# Patient Record
Sex: FEMALE | Race: BLACK OR AFRICAN AMERICAN | NOT HISPANIC OR LATINO
[De-identification: names, ages, dates, MRNs, and addresses within clinical notes are randomized per-mention and may not be internally consistent; named-entity substitution may affect disease eponyms.]

---

## 2017-05-22 ENCOUNTER — APPOINTMENT (OUTPATIENT)
Dept: CARDIOLOGY | Facility: CLINIC | Age: 67
End: 2017-05-22

## 2017-05-22 VITALS
WEIGHT: 184 LBS | HEART RATE: 67 BPM | BODY MASS INDEX: 32.59 KG/M2 | OXYGEN SATURATION: 98 % | DIASTOLIC BLOOD PRESSURE: 75 MMHG | SYSTOLIC BLOOD PRESSURE: 117 MMHG

## 2017-11-27 ENCOUNTER — NON-APPOINTMENT (OUTPATIENT)
Age: 67
End: 2017-11-27

## 2017-11-27 ENCOUNTER — APPOINTMENT (OUTPATIENT)
Dept: CARDIOLOGY | Facility: CLINIC | Age: 67
End: 2017-11-27
Payer: COMMERCIAL

## 2017-11-27 VITALS
HEIGHT: 63 IN | HEART RATE: 69 BPM | WEIGHT: 182 LBS | SYSTOLIC BLOOD PRESSURE: 109 MMHG | BODY MASS INDEX: 32.25 KG/M2 | OXYGEN SATURATION: 96 % | DIASTOLIC BLOOD PRESSURE: 71 MMHG

## 2017-11-27 DIAGNOSIS — E11.9 TYPE 2 DIABETES MELLITUS W/OUT COMPLICATIONS: ICD-10-CM

## 2017-11-27 PROCEDURE — 99214 OFFICE O/P EST MOD 30 MIN: CPT

## 2017-11-27 PROCEDURE — 93000 ELECTROCARDIOGRAM COMPLETE: CPT

## 2018-06-04 ENCOUNTER — APPOINTMENT (OUTPATIENT)
Dept: CARDIOLOGY | Facility: CLINIC | Age: 68
End: 2018-06-04
Payer: COMMERCIAL

## 2018-06-04 ENCOUNTER — NON-APPOINTMENT (OUTPATIENT)
Age: 68
End: 2018-06-04

## 2018-06-04 VITALS
SYSTOLIC BLOOD PRESSURE: 121 MMHG | OXYGEN SATURATION: 97 % | DIASTOLIC BLOOD PRESSURE: 76 MMHG | HEART RATE: 67 BPM | BODY MASS INDEX: 32.95 KG/M2 | WEIGHT: 186 LBS

## 2018-06-04 PROCEDURE — 93000 ELECTROCARDIOGRAM COMPLETE: CPT

## 2018-06-04 PROCEDURE — 99214 OFFICE O/P EST MOD 30 MIN: CPT

## 2018-06-19 ENCOUNTER — APPOINTMENT (OUTPATIENT)
Dept: CARDIOLOGY | Facility: CLINIC | Age: 68
End: 2018-06-19
Payer: COMMERCIAL

## 2018-06-19 PROCEDURE — 93306 TTE W/DOPPLER COMPLETE: CPT

## 2018-12-10 ENCOUNTER — TRANSCRIPTION ENCOUNTER (OUTPATIENT)
Age: 68
End: 2018-12-10

## 2018-12-10 ENCOUNTER — APPOINTMENT (OUTPATIENT)
Dept: CARDIOLOGY | Facility: CLINIC | Age: 68
End: 2018-12-10
Payer: COMMERCIAL

## 2018-12-10 ENCOUNTER — NON-APPOINTMENT (OUTPATIENT)
Age: 68
End: 2018-12-10

## 2018-12-10 VITALS
HEART RATE: 63 BPM | WEIGHT: 179 LBS | BODY MASS INDEX: 31.71 KG/M2 | SYSTOLIC BLOOD PRESSURE: 137 MMHG | OXYGEN SATURATION: 93 % | HEIGHT: 63 IN | DIASTOLIC BLOOD PRESSURE: 82 MMHG

## 2018-12-10 PROCEDURE — 93000 ELECTROCARDIOGRAM COMPLETE: CPT

## 2018-12-10 PROCEDURE — 99214 OFFICE O/P EST MOD 30 MIN: CPT

## 2018-12-10 NOTE — HISTORY OF PRESENT ILLNESS
[FreeTextEntry1] : \par HTN\par h/o CM\par \par EF improved. Echo 6/2018: EF 50-55.\par Denied exertional symptoms. \par Compliant with mets and with life style modification. \par Denied dyspnea, CP, palpitaion, edema.\par BP stable and controlled. \par Denied exertional symptoms, reporting >4.0 METS exercise tolerance.\par Denied dizziness, orthopnea, PND, edema, CP, palpitation, nausea, vomiting, hematuria, melena, syncope, near syncope. \par \par FUNCTIONAL CAPACITY\par Walks 30 minutes daily\par Reports >4 METS\par \par CHRONIC, STABLE CONDITIONS\par HTN: Stable\par DM: On oral meds\par hypothyroidism s/p partial thyroidectomy for benign polyp\par h/o Cardiomyopathy. She had angiogram in 2012 showed luminal irregularities, EF 20%, moderate MR. \par Her LV systolic function improved. Echo 5/2016: Global LV systolic Fx is normal. EF 50 - 55%.\par Ascending Aorta dilated at 4.13cm . the aortic arch. d/w pt to follow with V[facility]. \par \par \par PRIOR CARDIAC TESTING\par  Echo on 5/16/2016:\par LV size is normal.\par Mild LVH\par Global LV systolic Fx is normal. EF 50 - 55%.\par Grade I DD.\par Normal RV size and FX.\par Mildly dilated RA.\par Moderate MR.\par Moderate TR. PASP 41.9, mild PHT.\par \par She reports excellent exercise tolerance >7.0 METS\par walks 1/2 hr daily. \par walks more then 4 block daily.\par Climbs up 1 flight in house for about 3 - 4 times daily.\par She denies exertional symptoms.\par \par \par \par \par  \par

## 2018-12-10 NOTE — PHYSICAL EXAM
[General Appearance - Well Developed] : well developed [Normal Appearance] : normal appearance [Well Groomed] : well groomed [General Appearance - Well Nourished] : well nourished [No Deformities] : no deformities [Normal Conjunctiva] : the conjunctiva exhibited no abnormalities [Eyelids - No Xanthelasma] : the eyelids demonstrated no xanthelasmas [Normal Oral Mucosa] : normal oral mucosa [No Oral Pallor] : no oral pallor [No Oral Cyanosis] : no oral cyanosis [Normal Oropharynx] : normal oropharynx [Normal Jugular Venous A Waves Present] : normal jugular venous A waves present [Normal Jugular Venous V Waves Present] : normal jugular venous V waves present [Respiration, Rhythm And Depth] : normal respiratory rhythm and effort [Exaggerated Use Of Accessory Muscles For Inspiration] : no accessory muscle use [Auscultation Breath Sounds / Voice Sounds] : lungs were clear to auscultation bilaterally [Lungs Percussion] : the lungs were normal to percussion [Heart Rate And Rhythm] : heart rate and rhythm were normal [Heart Sounds] : normal S1 and S2 [Arterial Pulses Normal] : the arterial pulses were normal [Edema] : no peripheral edema present [Veins - Varicosity Changes] : no varicosital changes were noted in the lower extremities [Bowel Sounds] : normal bowel sounds [Abdomen Soft] : soft [Abdomen Tenderness] : non-tender [Abnormal Walk] : normal gait [Cyanosis, Localized] : no localized cyanosis [Skin Color & Pigmentation] : normal skin color and pigmentation [] : no rash [No Venous Stasis] : no venous stasis [Skin Lesions] : no skin lesions [Oriented To Time, Place, And Person] : oriented to person, place, and time [Impaired Insight] : insight and judgment were intact [Affect] : the affect was normal [Mood] : the mood was normal [No Anxiety] : not feeling anxious [FreeTextEntry1] : 1/6 SM at apex

## 2018-12-10 NOTE — REVIEW OF SYSTEMS
[Eyeglasses] : currently wearing eyeglasses [Negative] : Endocrine [Dysuria] : no dysuria [Incontinence] : no incontinence [Joint Pain] : no joint pain [Joint Swelling] : no joint swelling [Joint Stiffness] : no joint stiffness [Muscle Cramps] : no muscle cramps [Limb Weakness (Paresis)] : no limb weakness [Easy Bleeding] : no tendency for easy bleeding [Swollen Glands] : no swollen glands [Easy Bruising] : no tendency for easy bruising

## 2019-07-01 ENCOUNTER — NON-APPOINTMENT (OUTPATIENT)
Age: 69
End: 2019-07-01

## 2019-07-01 ENCOUNTER — APPOINTMENT (OUTPATIENT)
Dept: CARDIOLOGY | Facility: CLINIC | Age: 69
End: 2019-07-01
Payer: COMMERCIAL

## 2019-07-01 VITALS
HEART RATE: 59 BPM | OXYGEN SATURATION: 97 % | DIASTOLIC BLOOD PRESSURE: 82 MMHG | BODY MASS INDEX: 30.47 KG/M2 | SYSTOLIC BLOOD PRESSURE: 142 MMHG | WEIGHT: 172 LBS

## 2019-07-01 PROCEDURE — 99214 OFFICE O/P EST MOD 30 MIN: CPT

## 2019-07-01 PROCEDURE — 93000 ELECTROCARDIOGRAM COMPLETE: CPT

## 2019-07-01 NOTE — PHYSICAL EXAM
[General Appearance - Well Developed] : well developed [Normal Appearance] : normal appearance [Well Groomed] : well groomed [General Appearance - Well Nourished] : well nourished [No Deformities] : no deformities [Normal Conjunctiva] : the conjunctiva exhibited no abnormalities [Normal Oral Mucosa] : normal oral mucosa [No Oral Pallor] : no oral pallor [No Oral Cyanosis] : no oral cyanosis [Normal Oropharynx] : normal oropharynx [Normal Jugular Venous A Waves Present] : normal jugular venous A waves present [Normal Jugular Venous V Waves Present] : normal jugular venous V waves present [Respiration, Rhythm And Depth] : normal respiratory rhythm and effort [Exaggerated Use Of Accessory Muscles For Inspiration] : no accessory muscle use [Auscultation Breath Sounds / Voice Sounds] : lungs were clear to auscultation bilaterally [Lungs Percussion] : the lungs were normal to percussion [Heart Rate And Rhythm] : heart rate and rhythm were normal [Heart Sounds] : normal S1 and S2 [Arterial Pulses Normal] : the arterial pulses were normal [Edema] : no peripheral edema present [Veins - Varicosity Changes] : no varicosital changes were noted in the lower extremities [Bowel Sounds] : normal bowel sounds [Abdomen Soft] : soft [Abdomen Tenderness] : non-tender [Abnormal Walk] : normal gait [Cyanosis, Localized] : no localized cyanosis [Skin Color & Pigmentation] : normal skin color and pigmentation [] : no rash [No Venous Stasis] : no venous stasis [Skin Lesions] : no skin lesions [Oriented To Time, Place, And Person] : oriented to person, place, and time [Impaired Insight] : insight and judgment were intact [Affect] : the affect was normal [Mood] : the mood was normal [No Anxiety] : not feeling anxious [FreeTextEntry1] : 1/6 SM at apex

## 2019-07-01 NOTE — HISTORY OF PRESENT ILLNESS
[FreeTextEntry1] : \par HTN\par h/o CM\par \par EF improved. Echo 6/2018: EF 50-55.\par Chronic. Unchnaged. Stable.\par Doing well. Denied dyspnea, orthopnea, PND, edema, CP>\par Complinat with diet, medication and exercise.\par Denied exertional symptoms. \par BP stable and controlled. \par Denied dizziness, orthopnea, PND, edema, CP, palpitation, irregular;ler beats,  nausea, vomiting, hematuria, melena, syncope, near syncope. \par \par FUNCTIONAL CAPACITY\par Walks 30 minutes daily\par Reports >4 METS\par \par CHRONIC, STABLE CONDITIONS\par HTN: Stable\par DM: On oral meds\par hypothyroidism s/p partial thyroidectomy for benign polyp\par h/o Cardiomyopathy. She had angiogram in 2012 showed luminal irregularities, EF 20%, moderate MR. \par Her LV systolic function improved. Echo 5/2016: Global LV systolic Fx is normal. EF 50 - 55%.\par Ascending Aorta dilated at 4.13cm . the aortic arch. d/w pt to follow with CT Surgery.  This is d/w the pt during this visit  (7/1/2019) once again. She hasn't gone to Vascular surgery yet. She stated she will do so. \par \par \par PRIOR CARDIAC TESTING\par  Echo on  6/2018\par LV size is normal.\par Mild LVH\par Global LV systolic Fx is normal. EF 50 - 55%.\par Grade I DD.\par Normal RV size and FX.\par Mildly dilated RA.\par Mild-Moderate MR.\par Moderate TR. PASP 48.4, mild PHT.The ascending aorta is dilated, measuring 4.13 cm. The aortic arch 26 cm and descending aorta 2.0cm are normal in caliber. \par \par She reports excellent exercise tolerance >7.0 METS\par walks 1/2 hr daily. \par walks more then 4 block daily.\par Climbs up 1 flight in house for about 3 - 4 times daily.\par She denies exertional symptoms.\par \par \par \par \par  \par

## 2022-01-31 ENCOUNTER — APPOINTMENT (OUTPATIENT)
Dept: CARDIOLOGY | Facility: CLINIC | Age: 72
End: 2022-01-31
Payer: COMMERCIAL

## 2022-01-31 ENCOUNTER — NON-APPOINTMENT (OUTPATIENT)
Age: 72
End: 2022-01-31

## 2022-01-31 VITALS
HEART RATE: 74 BPM | SYSTOLIC BLOOD PRESSURE: 120 MMHG | TEMPERATURE: 98 F | WEIGHT: 166 LBS | DIASTOLIC BLOOD PRESSURE: 58 MMHG | BODY MASS INDEX: 29.41 KG/M2 | HEIGHT: 63 IN | OXYGEN SATURATION: 97 %

## 2022-01-31 DIAGNOSIS — I27.20 PULMONARY HYPERTENSION, UNSPECIFIED: ICD-10-CM

## 2022-01-31 DIAGNOSIS — R01.1 CARDIAC MURMUR, UNSPECIFIED: ICD-10-CM

## 2022-01-31 PROCEDURE — 93000 ELECTROCARDIOGRAM COMPLETE: CPT

## 2022-01-31 PROCEDURE — 99214 OFFICE O/P EST MOD 30 MIN: CPT

## 2022-01-31 NOTE — PHYSICAL EXAM
[General Appearance - Well Developed] : well developed [Normal Appearance] : normal appearance [Well Groomed] : well groomed [General Appearance - Well Nourished] : well nourished [No Deformities] : no deformities [Normal Oral Mucosa] : normal oral mucosa [No Oral Pallor] : no oral pallor [No Oral Cyanosis] : no oral cyanosis [Normal Oropharynx] : normal oropharynx [Normal Jugular Venous A Waves Present] : normal jugular venous A waves present [Normal Jugular Venous V Waves Present] : normal jugular venous V waves present [Respiration, Rhythm And Depth] : normal respiratory rhythm and effort [Exaggerated Use Of Accessory Muscles For Inspiration] : no accessory muscle use [Auscultation Breath Sounds / Voice Sounds] : lungs were clear to auscultation bilaterally [Lungs Percussion] : the lungs were normal to percussion [Heart Rate And Rhythm] : heart rate and rhythm were normal [Heart Sounds] : normal S1 and S2 [Arterial Pulses Normal] : the arterial pulses were normal [Edema] : no peripheral edema present [Veins - Varicosity Changes] : no varicosital changes were noted in the lower extremities [Bowel Sounds] : normal bowel sounds [Abdomen Soft] : soft [Abdomen Tenderness] : non-tender [Abnormal Walk] : normal gait [Cyanosis, Localized] : no localized cyanosis [Skin Color & Pigmentation] : normal skin color and pigmentation [] : no rash [No Venous Stasis] : no venous stasis [Skin Lesions] : no skin lesions [Oriented To Time, Place, And Person] : oriented to person, place, and time [Impaired Insight] : insight and judgment were intact [Affect] : the affect was normal [Mood] : the mood was normal [No Anxiety] : not feeling anxious [Well Developed] : well developed [Well Nourished] : well nourished [No Acute Distress] : no acute distress [Normal Conjunctiva] : normal conjunctiva [Normal Venous Pressure] : normal venous pressure [No Carotid Bruit] : no carotid bruit [Normal S1, S2] : normal S1, S2 [No Rub] : no rub [No Gallop] : no gallop [Clear Lung Fields] : clear lung fields [Good Air Entry] : good air entry [No Respiratory Distress] : no respiratory distress  [Soft] : abdomen soft [Non Tender] : non-tender [No Masses/organomegaly] : no masses/organomegaly [Normal Bowel Sounds] : normal bowel sounds [Normal Gait] : normal gait [No Edema] : no edema [No Cyanosis] : no cyanosis [No Clubbing] : no clubbing [No Varicosities] : no varicosities [No Rash] : no rash [No Skin Lesions] : no skin lesions [Moves all extremities] : moves all extremities [No Focal Deficits] : no focal deficits [Normal Speech] : normal speech [Alert and Oriented] : alert and oriented [Normal memory] : normal memory [de-identified] : 2/6  apex  [FreeTextEntry1] : 1/6 SM at apex

## 2022-01-31 NOTE — HISTORY OF PRESENT ILLNESS
[FreeTextEntry1] : \par HTN\par NICM\par \par EF improved. Echo 6/2018: EF 50-55.\par Chronic. Unchanged. Stable. Feels well. Dneied ydspnea, CP, palpitaion.\par Walks 30 minutes daily.\par Doing well. Denied dyspnea, orthopnea, PND, edema, CP>\par Denied exertional symptoms. \par BP stable and controlled. \par Denied dizziness, orthopnea, PND, edema, CP, palpitation, irregular, hematuria, melena, syncope, near syncope. \par \par FUNCTIONAL CAPACITY\par Walks 30 minutes daily\par Reports >4 METS\par \par CHRONIC, STABLE CONDITIONS\par HTN: Stable\par DM: On oral meds\par hypothyroidism s/p partial thyroidectomy for benign polyp\par h/o Cardiomyopathy. She had angiogram in 2012 showed luminal irregularities, EF 20%, moderate MR. \par Her LV systolic function improved. Echo 5/2016: Global LV systolic Fx is normal. EF 50 - 55%.\par Ascending Aorta dilated at 4.13cm . the aortic arch. d/w pt to follow with CT Surgery.  This is d/w the pt during this visit  (7/1/2019) once again. She hasn't gone to Vascular surgery yet. She stated she will do so. \par \par \par PRIOR CARDIAC TESTING\par  Echo on  6/2018\par LV size is normal.\par Mild LVH\par Global LV systolic Fx is normal. EF 50 - 55%.\par Grade I DD.\par Normal RV size and FX.\par Mildly dilated RA.\par Mild-Moderate MR.\par Moderate TR. PASP 48.4, mild PHT.The ascending aorta is dilated, measuring 4.13 cm. The aortic arch 26 cm and descending aorta 2.0cm are normal in caliber. \par \par She reports excellent exercise tolerance >7.0 METS\par walks 1/2 hr daily. \par walks more then 4 block daily.\par Climbs up 1 flight in house for about 3 - 4 times daily.\par She denies exertional symptoms.\par \par \par \par \par  \par

## 2022-01-31 NOTE — CARDIOLOGY SUMMARY
[___] : [unfilled] [de-identified] : 1/31/2022\par Sinus  Rhythm  - occasional PAC   \par # PACs = 1.\par Low voltage in precordial leads. \par  -Anterior infarct -age undetermined  -Old inferior infarct. \par ABNORMAL \par

## 2022-02-01 ENCOUNTER — APPOINTMENT (OUTPATIENT)
Dept: CARDIOLOGY | Facility: CLINIC | Age: 72
End: 2022-02-01
Payer: COMMERCIAL

## 2022-02-01 PROCEDURE — 93306 TTE W/DOPPLER COMPLETE: CPT

## 2022-08-01 ENCOUNTER — APPOINTMENT (OUTPATIENT)
Dept: CARDIOLOGY | Facility: CLINIC | Age: 72
End: 2022-08-01

## 2022-08-01 ENCOUNTER — NON-APPOINTMENT (OUTPATIENT)
Age: 72
End: 2022-08-01

## 2022-08-01 VITALS
WEIGHT: 167 LBS | HEART RATE: 74 BPM | HEIGHT: 63 IN | TEMPERATURE: 97.8 F | SYSTOLIC BLOOD PRESSURE: 116 MMHG | OXYGEN SATURATION: 94 % | DIASTOLIC BLOOD PRESSURE: 68 MMHG | BODY MASS INDEX: 29.59 KG/M2

## 2022-08-01 PROCEDURE — 93000 ELECTROCARDIOGRAM COMPLETE: CPT

## 2022-08-01 PROCEDURE — 99214 OFFICE O/P EST MOD 30 MIN: CPT | Mod: 25

## 2022-08-01 RX ORDER — LOSARTAN POTASSIUM 50 MG/1
50 TABLET, FILM COATED ORAL
Qty: 30 | Refills: 0 | Status: ACTIVE | COMMUNITY
Start: 2022-08-01 | End: 1900-01-01

## 2022-08-01 RX ORDER — ALBUTEROL SULFATE 90 UG/1
108 (90 BASE) INHALANT RESPIRATORY (INHALATION)
Refills: 0 | Status: DISCONTINUED | COMMUNITY
End: 2022-08-01

## 2022-08-01 RX ORDER — HYDROCHLOROTHIAZIDE 12.5 MG/1
12.5 TABLET ORAL DAILY
Refills: 0 | Status: DISCONTINUED | COMMUNITY
End: 2022-08-01

## 2022-08-01 NOTE — HISTORY OF PRESENT ILLNESS
[FreeTextEntry1] : \par HTN\par NICM\par \par TTE 5/2018: LVEF improved 50-55.\par TTE 2/2/2022: :LVEF 50-55. DD 1. Mild septal hypertrophy. Moderat RMR. Moderate TR. Dilated ascenbding Ao 4.2 cm. PASP 32 mmHg, \par Walks 30 minutes daily. Denied dyspnea, exertional symptoms, orthopnea, PND, edema, CP, syncope, enar syncope.\par BP stable and controlled. \par \par FUNCTIONAL CAPACITY\par Walks 30 minutes daily\par Reports >4 METS\par \par CHRONIC, STABLE CONDITIONS\par HTN: Stable\par DM: On oral meds\par hypothyroidism s/p partial thyroidectomy for benign polyp\par h/o Cardiomyopathy. She had angiogram in 2012 showed luminal irregularities, EF 20%, moderate MR. \par Her LV systolic function improved. Echo 5/2016: Global LV systolic Fx is normal. EF 50 - 55%.\par Ascending Aorta dilated at 4.13cm . the aortic arch. d/w pt to follow with CT Surgery.  This is d/w the pt during this visit  (7/1/2019) once again. She hasn't gone to CT surgery yet. \par \par \par PRIOR CARDIAC TESTING\par Echo on  6/2018\par LV size is normal.\par Mild LVH\par Global LV systolic Fx is normal. EF 50 - 55%.\par Grade I DD.\par Normal RV size and FX.\par Mildly dilated RA.\par Mild-Moderate MR.\par Moderate TR. PASP 48.4, mild PHT.The ascending aorta is dilated, measuring 4.13 cm. The aortic arch 26 cm and descending aorta 2.0cm are normal in caliber. \par \par She reports excellent exercise tolerance >7.0 METS\par walks 1/2 hr daily. \par walks more then 4 block daily.\par Climbs up 1 flight in house for about 3 - 4 times daily.\par She denies exertional symptoms.\par \par \par \par \par  \par

## 2022-08-01 NOTE — CARDIOLOGY SUMMARY
[___] : [unfilled] [de-identified] : 8/1/2022\par Sinus  Rhythm \par -Anterior infarct -age undetermined  -Old inferior infarct. \par ABNORMAL \par \par \par 1/31/2022\par Sinus  Rhythm  - occasional PAC   \par # PACs = 1.\par Low voltage in precordial leads. \par  -Anterior infarct -age undetermined  -Old inferior infarct. \par ABNORMAL \par

## 2022-08-01 NOTE — REASON FOR VISIT
[Symptom and Test Evaluation] : symptom and test evaluation [Structural Heart and Valve Disease] : structural heart and valve disease [Hypertension] : hypertension [Follow-Up - Clinic] : a clinic follow-up of [FreeTextEntry1] : HTN, history of CMP

## 2022-08-01 NOTE — PHYSICAL EXAM
[Well Developed] : well developed [Well Nourished] : well nourished [No Acute Distress] : no acute distress [Normal Venous Pressure] : normal venous pressure [No Carotid Bruit] : no carotid bruit [Normal S1, S2] : normal S1, S2 [No Rub] : no rub [No Gallop] : no gallop [Clear Lung Fields] : clear lung fields [Good Air Entry] : good air entry [No Respiratory Distress] : no respiratory distress  [Soft] : abdomen soft [Non Tender] : non-tender [No Masses/organomegaly] : no masses/organomegaly [Normal Bowel Sounds] : normal bowel sounds [Normal Gait] : normal gait [No Edema] : no edema [No Cyanosis] : no cyanosis [No Clubbing] : no clubbing [No Varicosities] : no varicosities [No Rash] : no rash [No Skin Lesions] : no skin lesions [Moves all extremities] : moves all extremities [No Focal Deficits] : no focal deficits [Normal Speech] : normal speech [Alert and Oriented] : alert and oriented [Normal memory] : normal memory [General Appearance - Well Developed] : well developed [Normal Appearance] : normal appearance [Well Groomed] : well groomed [General Appearance - Well Nourished] : well nourished [No Deformities] : no deformities [Normal Conjunctiva] : the conjunctiva exhibited no abnormalities [Normal Oral Mucosa] : normal oral mucosa [No Oral Cyanosis] : no oral cyanosis [No Oral Pallor] : no oral pallor [Normal Oropharynx] : normal oropharynx [Normal Jugular Venous A Waves Present] : normal jugular venous A waves present [Normal Jugular Venous V Waves Present] : normal jugular venous V waves present [Respiration, Rhythm And Depth] : normal respiratory rhythm and effort [Exaggerated Use Of Accessory Muscles For Inspiration] : no accessory muscle use [Auscultation Breath Sounds / Voice Sounds] : lungs were clear to auscultation bilaterally [Lungs Percussion] : the lungs were normal to percussion [Heart Rate And Rhythm] : heart rate and rhythm were normal [Heart Sounds] : normal S1 and S2 [Arterial Pulses Normal] : the arterial pulses were normal [Edema] : no peripheral edema present [Veins - Varicosity Changes] : no varicosital changes were noted in the lower extremities [Bowel Sounds] : normal bowel sounds [Abdomen Soft] : soft [Abdomen Tenderness] : non-tender [Abnormal Walk] : normal gait [Cyanosis, Localized] : no localized cyanosis [Skin Color & Pigmentation] : normal skin color and pigmentation [] : no rash [No Venous Stasis] : no venous stasis [Skin Lesions] : no skin lesions [Oriented To Time, Place, And Person] : oriented to person, place, and time [Impaired Insight] : insight and judgment were intact [Affect] : the affect was normal [Mood] : the mood was normal [No Anxiety] : not feeling anxious [de-identified] : 2/6  apex  [FreeTextEntry1] : 1/6 SM at apex

## 2022-08-31 ENCOUNTER — NON-APPOINTMENT (OUTPATIENT)
Age: 72
End: 2022-08-31

## 2023-02-06 ENCOUNTER — APPOINTMENT (OUTPATIENT)
Dept: CARDIOLOGY | Facility: CLINIC | Age: 73
End: 2023-02-06
Payer: COMMERCIAL

## 2023-02-06 ENCOUNTER — NON-APPOINTMENT (OUTPATIENT)
Age: 73
End: 2023-02-06

## 2023-02-06 VITALS
SYSTOLIC BLOOD PRESSURE: 112 MMHG | OXYGEN SATURATION: 95 % | HEART RATE: 77 BPM | HEIGHT: 63 IN | WEIGHT: 167.25 LBS | BODY MASS INDEX: 29.63 KG/M2 | TEMPERATURE: 98.3 F | DIASTOLIC BLOOD PRESSURE: 64 MMHG

## 2023-02-06 PROCEDURE — 99214 OFFICE O/P EST MOD 30 MIN: CPT | Mod: 25

## 2023-02-06 PROCEDURE — 93000 ELECTROCARDIOGRAM COMPLETE: CPT

## 2023-02-06 NOTE — END OF VISIT
Pulmonary Consultation Note      History     Chief Complaint   Patient presents with   • Medical Problem Re-evaluation   • Hives     Smoking: Former smoker  Leah Randolph is a 72 year old female from nursing home with PMHx of anemia, CKD, COPD, HTN, GERD, RAD, dual pacemaker, afib on eliquis, sleep apnea, subdural hemorrhage (may 2021) s/p craniotomy complicated by recurrent subdural bleed requiring sarah holes, urinary incontinence, was recently discharge on July 7 for fever and encephalopathy, concern for meningitis, treated with ceftriaxone and vancomycin since June 30, presented in the ED for rash. Per chart review, she developed rash after antibiotic administration, it was unclear what was given. She has a diffuse maculopapular rash, with raised borders, pruritic, she had some wheezing on arrival. She is confused so history is limited and acquired mainly from chart review.     In ER, awake, alert, oriented only to self. She is following commands. V/S 144/72 83, 99.9F rectal, 20 satting 96% on 3L. Lab shows normal abg. CBC wbc 13.0, hgb 9.9, normal platelet count. CMP has elevated creatinine, otherwise unremarkable. Lactic acid 0.8. trop negative. UA shows trace ketones, protein, leukocytes, few bacteria and hyaline cast. CXR no acute findings, no consolidation. CTH no interval change from previous, there is stable subdural fluid over left frontal convexity at the vertex. No new hemorrhage.  US BLE negative for DVT.       Past Medical History:   Diagnosis Date   • Anemia    • Cardiac pacemaker    • Chronic kidney disease    • COPD (chronic obstructive pulmonary disease) (CMS/HCC)    • Depression    • Essential (primary) hypertension    • Fracture    • Gastroesophageal reflux disease    • RAD (reactive airway disease)    • Sleep apnea    • Subdural hemorrhage (CMS/HCC)    • Urinary incontinence      Past Surgical History:   Procedure Laterality Date   • Craniotomy     • Hysterectomy     • Removal gallbladder      • Spine surgery      cervicle with titanium     Social History     Tobacco Use   • Smoking status: Former Smoker     Quit date: 12/2010     Years since quitting: 10.6   • Smokeless tobacco: Never Used   Vaping Use   • Vaping Use: Never assessed   Substance Use Topics   • Alcohol use: Not Currently     Comment: socially   • Drug use: Never     Family History   Problem Relation Age of Onset   • Cancer Mother    • Cancer Father          • lactated ringers infusion 125 mL/hr at 07/10/21 0300        ALLERGIES:   Allergen Reactions   • Codeine GI UPSET and Nausea & Vomiting        ROS: unable to acquire, confused. Although denies any complaints.     Physical Exam     Visit Vitals  /54 (BP Location: LUE - Left upper extremity)   Pulse 83   Temp 97.3 °F (36.3 °C) (Oral)   Resp (!) 25   LMP 11/29/1984 (Approximate)   SpO2 95%       Physical Exam:  General: obese female, in bed, resting comfortably in nad  Neuro: awake, alert, disoriented, follows commands, moves all four extremities.   Head: nc, at  EENT: no sinus ttp  Neck: supple, no jvd, no appreciable LAD  Resp: diminished, clear  CV: s1,s2; rrr; no audible murmur  Abd: s, nt, bs+, distended  Ext: no clubbing or edema; pulses present and equal bilaterally. PICC upper arm.   Skin: diffuse and generalized pruritic maculopapular rashes with raised borders more pronounced in the lower extremities.     Recent Labs   Lab 07/09/21 2238 07/05/21 0422 07/04/21  0655 07/04/21  0525   WBC 13.0* 10.0  --  8.3   RBC 2.71* 2.48*  --  2.41*   HGB 7.9* 7.2* 7.4* 6.9*   HCT 26.1* 24.3* 26.0* 23.9*   MCV 96.3 98.0  --  99.2   MCH 29.2 29.0  --  28.6   MCHC 30.3* 29.6*  --  28.9*    269  --  254     Recent Labs   Lab 07/09/21 2238 07/06/21  0946 07/05/21  0422   CO2 28 28 28   BUN 31* 19 19   AST 10  --   --      No results found   CT HEAD WO CONTRAST    Result Date: 7/10/2021  PROCEDURE INFORMATION: Exam: CT Head Without Contrast Exam date and time: 7/10/2021 1:05 AM  Age: 72 years old Clinical indication: Pain; Additional info: Mental status change, unknown cause TECHNIQUE: Imaging protocol: Computed tomography of the head without contrast. Radiation optimization: All CT scans at this facility use at least one of these dose optimization techniques: automated exposure control; mA and/or kV adjustment per patient size (includes targeted exams where dose is matched to clinical indication); or iterative reconstruction. COMPARISON: CT HEAD W WO CONTRAST EP 7/2/2021 8:02 AM FINDINGS: Brain: There is stable trace subdural fluid (maximum thickness of 4-5 mm)  over the left frontal convexity at the vertex. There are overlying craniotomy changes and sarah hole defects compared to prior study performed July 2, 2021.  There is no new hemorrhage. There is no midline shift or herniation.  Incidental note is made of hyperostosis frontalis. Cerebral ventricles: No ventriculomegaly. Paranasal sinuses: Visualized sinuses are unremarkable. No fluid levels. Mastoid air cells: Visualized mastoid air cells are well aerated. Bones/joints: See \"Brain\" finding. Soft tissues: Unremarkable.     No significant interval change. Electronically Signed by: JOS SHORT M.D. Signed on: 07/10/2021 01:39 AM    XR CHEST PA OR AP 1 VIEW    Result Date: 7/10/2021  PROCEDURE INFORMATION: Exam: XR Chest Exam date and time: 7/9/2021 11:22 PM Age: 72 years old Clinical indication: Shortness of breath; Additional info: SOB TECHNIQUE: Imaging protocol: XR of the chest. Views: 1 view. COMPARISON: CR XR CHEST PA OR AP 1 VIEW EP 6/30/2021 4:22 AM FINDINGS: Tubes, catheters and devices: There is a dual lead cardiac pacer. The the stable appearance of right PICC line catheter terminating at caval atrial junction. Lungs: Linear atelectasis and scarring within right mid lung.  No focal consolidation. Pleural spaces: Unremarkable. No pleural effusion. No pneumothorax. Heart/Mediastinum: Heart size is moderately enlarged.  Vasculature: The aorta is ectatic. Bones/joints: There is lower cervical spine fusion.      No significant interval change. Electronically Signed by: JOS SHORT M.D. Signed on: 07/10/2021 01:16 AM    US VASC EXTREMITY LOWER VENOUS DUPLEX    Result Date: 7/10/2021  PROCEDURE INFORMATION: Exam: US Bilateral Duplex Lower Extremity Veins, Limited Exam date and time: 7/10/2021 2:31 AM Age: 72 years old Clinical indication: Acute kidney failure, unspecified; Rash and other nonspecific skin eruption; Pain; Additional info: Deep vein thrombosis TECHNIQUE: Imaging protocol: Bilateral Real-time duplex ultrasound of the extremities with 2-D gray scale, color Doppler flow and spectral waveform analysis including responses to compression and other maneuvers with image documentation. Limited exam was focused on  the bilateral lower extremity veins. COMPARISON: VASC EXT LOWR VENOUS DPLX NESSA 11/11/2016 5:47 PM FINDINGS: Right deep veins: Unremarkable. The common femoral, femoral, proximal profunda femoral and popliteal veins are patent without thrombus. Normal Doppler waveforms. Normal compressibility and/or augmentation response.  There is limited assessment of the distal right popliteal vein due to limitations in patient positioning. Right superficial veins: Saphenofemoral junction is patent without thrombus. Left deep veins: Unremarkable. The common femoral, femoral, proximal profunda femoral and popliteal veins are patent without thrombus. Normal Doppler waveforms. Normal compressibility and/or augmentation response.  Left superficial veins: Saphenofemoral junction is patent without thrombus. Soft tissues: Unremarkable. Other findings: Study is limited due to patient's body habitus and limited mobility. Ultrasound technologist describes slow venous blood flow at the time of imaging.     No evidence of DVT Electronically Signed by: JOS SHORT M.D. Signed on: 07/10/2021 03:12 AM       Assessment   Rash   -urticaria  vs antibiotic drug reaction vs serum sickness type reaction  Hx of subdural hemorrhage (may 2021) s/p craniotomy complicated by recurrent subdural bleed requiring sarah holes and concern for meningitis  -ceftriaxone  -vancomycin  Leukocytosis, likely from above  -wbc 13.0  CHEPE on CKD  Normocytic Anemia, stable  -hgb 7.9  Encephalopathy  -negative CTH, no new changes  Wheezing, likely induced by above  -Possible Acute COPD exacerbation   -hx of reactive airway disease  Hypertension   GERD   S/p dual pacemaker   Atrial fibrillation on eliquis   Sleep apnea   Urinary incontinence     Plan   Keep O2 >92%, on 2L per NC  Continue bronchodilators  Continue symbicort  Benadryl PO 25 mg q6   Will hold off on giving steroids until ok by ID  Antibiotics per ID recommendations  Home medications continued  Full code  DVT prophylaxis heparin  ACP discussed with RN and supervising MD  CCT >45 minutes    Serafin Newby CNP     Patient is seen and examined independently.  Discussed with Pulmonary nurse practitioner  Agree with the physical and exam. Treatment plan is reviewed and developed.  I participated in the following activities of this patients care: medical decision making.    We will downgrade to telemetry    Danyel Heath MD, [facility]  Pulmonary , Critical care and Sleep medicine     [Time Spent: ___ minutes] : I have spent [unfilled] minutes of time on the encounter.

## 2023-02-09 NOTE — PHYSICAL EXAM
[Well Developed] : well developed [Well Nourished] : well nourished [No Acute Distress] : no acute distress [Normal Venous Pressure] : normal venous pressure [No Carotid Bruit] : no carotid bruit [Normal S1, S2] : normal S1, S2 [No Rub] : no rub [No Gallop] : no gallop [Clear Lung Fields] : clear lung fields [Good Air Entry] : good air entry [No Respiratory Distress] : no respiratory distress  [Soft] : abdomen soft [Non Tender] : non-tender [No Masses/organomegaly] : no masses/organomegaly [Normal Bowel Sounds] : normal bowel sounds [Normal Gait] : normal gait [No Edema] : no edema [No Cyanosis] : no cyanosis [No Clubbing] : no clubbing [No Varicosities] : no varicosities [No Rash] : no rash [No Skin Lesions] : no skin lesions [Moves all extremities] : moves all extremities [No Focal Deficits] : no focal deficits [Normal Speech] : normal speech [Alert and Oriented] : alert and oriented [Normal memory] : normal memory [General Appearance - Well Developed] : well developed [Normal Appearance] : normal appearance [Well Groomed] : well groomed [General Appearance - Well Nourished] : well nourished [No Deformities] : no deformities [Normal Conjunctiva] : the conjunctiva exhibited no abnormalities [Normal Oral Mucosa] : normal oral mucosa [No Oral Pallor] : no oral pallor [No Oral Cyanosis] : no oral cyanosis [Normal Oropharynx] : normal oropharynx [Normal Jugular Venous A Waves Present] : normal jugular venous A waves present [Normal Jugular Venous V Waves Present] : normal jugular venous V waves present [Respiration, Rhythm And Depth] : normal respiratory rhythm and effort [Exaggerated Use Of Accessory Muscles For Inspiration] : no accessory muscle use [Auscultation Breath Sounds / Voice Sounds] : lungs were clear to auscultation bilaterally [Lungs Percussion] : the lungs were normal to percussion [Heart Rate And Rhythm] : heart rate and rhythm were normal [Heart Sounds] : normal S1 and S2 [Arterial Pulses Normal] : the arterial pulses were normal [Edema] : no peripheral edema present [Veins - Varicosity Changes] : no varicosital changes were noted in the lower extremities [Bowel Sounds] : normal bowel sounds [Abdomen Soft] : soft [Abdomen Tenderness] : non-tender [Abnormal Walk] : normal gait [Cyanosis, Localized] : no localized cyanosis [Skin Color & Pigmentation] : normal skin color and pigmentation [] : no rash [No Venous Stasis] : no venous stasis [Skin Lesions] : no skin lesions [Oriented To Time, Place, And Person] : oriented to person, place, and time [Impaired Insight] : insight and judgment were intact [Affect] : the affect was normal [Mood] : the mood was normal [No Anxiety] : not feeling anxious [de-identified] : 2/6  apex  [FreeTextEntry1] : 1/6 SM at apex

## 2023-02-09 NOTE — CARDIOLOGY SUMMARY
[___] : [unfilled] [de-identified] : 2/6/2023\par Sinus  Rhythm \par Low voltage in precordial leads. \par  -Old inferior infarct. \par  -  Negative precordial T-waves  -Probably normal -consider anteroseptal ischemia. \par ABNORMAL \par \par \par 8/1/2022\par Sinus  Rhythm \par -Anterior infarct -age undetermined  -Old inferior infarct. \par ABNORMAL \par \par \par 1/31/2022\par Sinus  Rhythm  - occasional PAC   \par # PACs = 1.\par Low voltage in precordial leads. \par  -Anterior infarct -age undetermined  -Old inferior infarct. \par ABNORMAL \par

## 2023-02-09 NOTE — HISTORY OF PRESENT ILLNESS
[FreeTextEntry1] : \par \par NICM\par A) h/o Cardiomyopathy. She had angiogram in 2012 showed luminal irregularities, EF 20%, moderate MR. \par B) Her LV systolic function improved. Echo 5/2016: Global LV systolic Fx is normal. EF 50 - 55%.\par C) TTE 5/2018: LVEF improved 50-55. \par D) TTE 2/2/2022: :LVEF 50-55. DD 1. Mild septal hypertrophy. Moderate MR. Moderate TR. Dilated ascending Ao 4.2 cm. PASP 32 mmHg, \par \par HTN\par Controlled.\par Self BP checks d/w pt/  \par \par \par ROS\par Denied dyspnea, orthopnea, pnd, edema CP, palpitation, syncope, near syncope. \par \par \par FUNCTIONAL CAPACITY\par Walks 30 minutes daily\par Reports >4 METS\par \par \par CHRONIC, STABLE CONDITIONS\par DM: On oral meds\par hypothyroidism s/p partial thyroidectomy for benign polyp\par Ascending Aorta dilated at 4.13cm . the aortic arch. d/w pt to follow with CT Surgery.  This is d/w the pt during this visit  (7/1/2019) once again. She hasn't gone to CT surgery yet. \par \par \par PRIOR CARDIAC TESTING\par Echo on  6/2018\par LV size is normal.\par Mild LVH\par Global LV systolic Fx is normal. EF 50 - 55%.\par Grade I DD.\par Normal RV size and FX.\par Mildly dilated RA.\par Mild-Moderate MR.\par Moderate TR. PASP 48.4, mild PHT.The ascending aorta is dilated, measuring 4.13 cm. The aortic arch 26 cm and descending aorta 2.0cm are normal in caliber. \par \par  \par

## 2023-03-24 ENCOUNTER — OFFICE (OUTPATIENT)
Dept: URBAN - METROPOLITAN AREA CLINIC 104 | Facility: CLINIC | Age: 73
Setting detail: OPHTHALMOLOGY
End: 2023-03-24
Payer: COMMERCIAL

## 2023-03-24 DIAGNOSIS — H43.813: ICD-10-CM

## 2023-03-24 DIAGNOSIS — H35.3132: ICD-10-CM

## 2023-03-24 DIAGNOSIS — E11.9: ICD-10-CM

## 2023-03-24 DIAGNOSIS — H26.493: ICD-10-CM

## 2023-03-24 DIAGNOSIS — Z96.1: ICD-10-CM

## 2023-03-24 DIAGNOSIS — Z79.84: ICD-10-CM

## 2023-03-24 DIAGNOSIS — H40.013: ICD-10-CM

## 2023-03-24 PROCEDURE — 92014 COMPRE OPH EXAM EST PT 1/>: CPT | Performed by: OPTOMETRIST

## 2023-03-24 PROCEDURE — 92250 FUNDUS PHOTOGRAPHY W/I&R: CPT | Performed by: OPTOMETRIST

## 2023-03-24 ASSESSMENT — REFRACTION_AUTOREFRACTION
OS_CYLINDER: -2.00
OD_AXIS: 153
OD_SPHERE: +0.25
OD_CYLINDER: -0.75
OS_SPHERE: +1.00
OS_AXIS: 076

## 2023-03-24 ASSESSMENT — CONFRONTATIONAL VISUAL FIELD TEST (CVF)
OS_FINDINGS: FULL
OD_FINDINGS: FULL

## 2023-03-24 ASSESSMENT — PACHYMETRY
OS_CT_UM: 581
OS_CT_CORRECTION: -3
OD_CT_UM: 580
OD_CT_CORRECTION: -3

## 2023-03-24 ASSESSMENT — REFRACTION_CURRENTRX
OD_OVR_VA: 20/
OS_OVR_VA: 20/

## 2023-03-24 ASSESSMENT — SPHEQUIV_DERIVED
OS_SPHEQUIV: 0
OD_SPHEQUIV: -0.125

## 2023-03-24 ASSESSMENT — VISUAL ACUITY
OD_BCVA: 20/30-2
OS_BCVA: 20/25-2

## 2023-03-24 ASSESSMENT — TONOMETRY
OD_IOP_MMHG: 16
OS_IOP_MMHG: 16

## 2023-05-22 ENCOUNTER — APPOINTMENT (OUTPATIENT)
Dept: CARDIOLOGY | Facility: CLINIC | Age: 73
End: 2023-05-22

## 2023-06-05 ENCOUNTER — NON-APPOINTMENT (OUTPATIENT)
Age: 73
End: 2023-06-05

## 2023-06-05 ENCOUNTER — APPOINTMENT (OUTPATIENT)
Dept: CARDIOLOGY | Facility: CLINIC | Age: 73
End: 2023-06-05
Payer: COMMERCIAL

## 2023-06-05 VITALS
TEMPERATURE: 98.1 F | HEART RATE: 75 BPM | WEIGHT: 166 LBS | BODY MASS INDEX: 29.41 KG/M2 | DIASTOLIC BLOOD PRESSURE: 66 MMHG | RESPIRATION RATE: 16 BRPM | OXYGEN SATURATION: 95 % | SYSTOLIC BLOOD PRESSURE: 110 MMHG | HEIGHT: 63 IN

## 2023-06-05 DIAGNOSIS — E78.5 HYPERLIPIDEMIA, UNSPECIFIED: ICD-10-CM

## 2023-06-05 PROCEDURE — 93000 ELECTROCARDIOGRAM COMPLETE: CPT

## 2023-06-05 PROCEDURE — 99214 OFFICE O/P EST MOD 30 MIN: CPT | Mod: 25

## 2023-06-05 RX ORDER — AMLODIPINE BESYLATE 5 MG/1
5 TABLET ORAL DAILY
Qty: 90 | Refills: 0 | Status: ACTIVE | COMMUNITY
Start: 1900-01-01 | End: 1900-01-01

## 2023-08-22 NOTE — DISCUSSION/SUMMARY
Positive throat culture. Called mom. Patient is feeling better but she would like to go through with antibiotics. [Patient] : the patient [With Me] : with me [___ Month(s)] : in [unfilled] month(s)

## 2023-09-29 ENCOUNTER — OFFICE (OUTPATIENT)
Dept: URBAN - METROPOLITAN AREA CLINIC 104 | Facility: CLINIC | Age: 73
Setting detail: OPHTHALMOLOGY
End: 2023-09-29
Payer: COMMERCIAL

## 2023-09-29 DIAGNOSIS — H43.813: ICD-10-CM

## 2023-09-29 DIAGNOSIS — Z96.1: ICD-10-CM

## 2023-09-29 DIAGNOSIS — H26.493: ICD-10-CM

## 2023-09-29 DIAGNOSIS — H35.3132: ICD-10-CM

## 2023-09-29 DIAGNOSIS — E11.9: ICD-10-CM

## 2023-09-29 DIAGNOSIS — Z79.84: ICD-10-CM

## 2023-09-29 DIAGNOSIS — H40.013: ICD-10-CM

## 2023-09-29 PROCEDURE — 92014 COMPRE OPH EXAM EST PT 1/>: CPT | Performed by: OPTOMETRIST

## 2023-09-29 PROCEDURE — 92134 CPTRZ OPH DX IMG PST SGM RTA: CPT | Performed by: OPTOMETRIST

## 2023-09-29 ASSESSMENT — REFRACTION_AUTOREFRACTION
OD_SPHERE: +0.75
OD_CYLINDER: -1.00
OD_AXIS: 131
OS_CYLINDER: -1.50
OS_SPHERE: +1.00
OS_AXIS: 075

## 2023-09-29 ASSESSMENT — PACHYMETRY
OD_CT_UM: 580
OD_CT_CORRECTION: -3
OS_CT_CORRECTION: -3
OS_CT_UM: 581

## 2023-09-29 ASSESSMENT — KERATOMETRY
OS_K2POWER_DIOPTERS: 45.18
OD_K2POWER_DIOPTERS: 44.70
OD_AXISANGLE_DEGREES: 027
OS_AXISANGLE_DEGREES: 169
OD_K1POWER_DIOPTERS: 43.83
OS_K1POWER_DIOPTERS: 43.83

## 2023-09-29 ASSESSMENT — REFRACTION_CURRENTRX
OD_CYLINDER: -0.50
OD_AXIS: 104
OS_SPHERE: +2.50
OD_SPHERE: +2.00
OS_CYLINDER: -0.75
OS_AXIS: 101
OD_OVR_VA: 20/
OS_OVR_VA: 20/

## 2023-09-29 ASSESSMENT — VISUAL ACUITY
OD_BCVA: 20/40-
OS_BCVA: 20/20

## 2023-09-29 ASSESSMENT — SPHEQUIV_DERIVED
OD_SPHEQUIV: 0.25
OS_SPHEQUIV: 0.25

## 2023-09-29 ASSESSMENT — AXIALLENGTH_DERIVED
OD_AL: 23.2195
OS_AL: 23.13

## 2023-09-29 ASSESSMENT — TONOMETRY
OD_IOP_MMHG: 16
OS_IOP_MMHG: 16

## 2023-09-29 ASSESSMENT — CONFRONTATIONAL VISUAL FIELD TEST (CVF)
OD_FINDINGS: FULL
OS_FINDINGS: FULL

## 2023-12-11 ENCOUNTER — APPOINTMENT (OUTPATIENT)
Dept: CARDIOLOGY | Facility: CLINIC | Age: 73
End: 2023-12-11

## 2023-12-28 ENCOUNTER — APPOINTMENT (OUTPATIENT)
Dept: CARDIOLOGY | Facility: CLINIC | Age: 73
End: 2023-12-28
Payer: COMMERCIAL

## 2023-12-28 ENCOUNTER — NON-APPOINTMENT (OUTPATIENT)
Age: 73
End: 2023-12-28

## 2023-12-28 VITALS
TEMPERATURE: 98.4 F | BODY MASS INDEX: 30.12 KG/M2 | SYSTOLIC BLOOD PRESSURE: 124 MMHG | OXYGEN SATURATION: 95 % | HEART RATE: 82 BPM | WEIGHT: 170 LBS | DIASTOLIC BLOOD PRESSURE: 74 MMHG | HEIGHT: 63 IN

## 2023-12-28 PROCEDURE — 99214 OFFICE O/P EST MOD 30 MIN: CPT | Mod: 25

## 2023-12-28 PROCEDURE — 93000 ELECTROCARDIOGRAM COMPLETE: CPT

## 2024-01-02 NOTE — HISTORY OF PRESENT ILLNESS
[FreeTextEntry1] : NICM A) h/o Cardiomyopathy. She had angiogram in 2012 showed luminal irregularities, EF 20%, moderate MR.  B) Her LV systolic function improved. Echo 5/2016: Global LV systolic Fx is normal. EF 50 - 55%. C) TTE 5/2018: LVEF improved 50-55.  D) TTE 2/2/2022: :LVEF 50-55. DD 1. Mild septal hypertrophy. Moderate MR. Moderate TR. Dilated ascending Ao 4.2 cm. PASP 32 mmHg,   HTN Controlled.  checks BP at home , typically 110's/ 70's  ROS Denies chest pain, SOB/ANGUIANO, PND, orthopnea, LE edema, palpitations, lightheadedness, syncope She has no complaints, feels well Labs from 3/2023 noted, Cr 0.79, pBNP 14   FUNCTIONAL CAPACITY Walks 30 minutes daily, outdoors or in the mall in inclement weather  Reports >4 METS  CHRONIC, STABLE CONDITIONS DM: On oral meds hypothyroidism s/p partial thyroidectomy for benign polyp Ascending Aorta dilated at 4.2cm. Dr Hood has previously d/w pt to follow with CT Surgery.  This is d/w the pt again today. She hasn't gone to CT surgery yet.    PRIOR CARDIAC TESTING Echo on  6/2018 LV size is normal. Mild LVH Global LV systolic Fx is normal. EF 50 - 55%. Grade I DD. Normal RV size and FX. Mildly dilated RA. Mild-Moderate MR. Moderate TR. PASP 48.4, mild PHT.The ascending aorta is dilated, measuring 4.13 cm. The aortic arch 26 cm and descending aorta 2.0cm are normal in caliber.    12/28/2023 Returns for scheduled office visit. She does walking exercises daily for 30 minutes. Reports of feeling good. Denies chest pain, shortness of breath, palpitations, syncope or near syncope, orthopnea and PND. Compliant with medications. No activity intolerance. No leg edema. She had recent labs from her PCP - Dr. Km Fay MD/Leo Barkley NP.

## 2024-01-02 NOTE — PHYSICAL EXAM
[Normal] : normal venous pressure, no carotid bruit [Normal S1, S2] : normal S1, S2 [No Rub] : no rub [No Gallop] : no gallop [Murmur] : murmur [Clear Lung Fields] : clear lung fields [Good Air Entry] : good air entry [No Respiratory Distress] : no respiratory distress  [Soft] : abdomen soft [Non Tender] : non-tender [Normal Bowel Sounds] : normal bowel sounds [Normal Gait] : normal gait [No Edema] : no edema [No Cyanosis] : no cyanosis [Moves all extremities] : moves all extremities [No Focal Deficits] : no focal deficits [Normal Speech] : normal speech [Alert and Oriented] : alert and oriented [Normal memory] : normal memory [Well Developed] : well developed [No Acute Distress] : no acute distress [No Carotid Bruit] : no carotid bruit [de-identified] : 2/6 SM

## 2024-01-02 NOTE — CARDIOLOGY SUMMARY
[___] : [unfilled] [de-identified] : 12/28/2023L Sinus Rhythm, negative T waves in V1-V2 -Anterior infarct -age undetermined.  06/5/2023 Sinus rhythm 76, low voltage in precordial leads, anterior infarct age undetermined   2/6/2023 Sinus  Rhythm  Low voltage in precordial leads.   -Old inferior infarct.   -  Negative precordial T-waves  -Probably normal -consider anteroseptal ischemia.  ABNORMAL    8/1/2022 Sinus  Rhythm  -Anterior infarct -age undetermined  -Old inferior infarct.  ABNORMAL    1/31/2022 Sinus  Rhythm  - occasional PAC    # PACs = 1. Low voltage in precordial leads.   -Anterior infarct -age undetermined  -Old inferior infarct.  ABNORMAL

## 2024-01-02 NOTE — DISCUSSION/SUMMARY
[Patient] : the patient [Risks] : risks [Benefits] : benefits [Alternatives] : alternatives [FreeTextEntry1] : Returns for scheduled office visit. She does walking exercises daily for 30 minutes. Reports of feeling good. Denies chest pain, shortness of breath, palpitations, syncope or near syncope, orthopnea and PND. Compliant with medications. No activity intolerance. No leg edema. She had recent labs from her PCP - Dr. Km Fay MD/Leo Barkley NP.  A/P  1. HTN: well controlled, continue same meds 2. CM: non ischemic CM, with improved LVEF 50-55%: Appears euvolemic. On Carvedilol and Losartan, continue same meds. Continue exercise as tolerated 3. Dilated Asc aorta 4.2 cm on TTE. Continue optimal BP management.  4. Will request recent labs from her PCP 5. Follow-up with Dr. Hood in 6 months, sooner if needed.   Patient was advised to contact the office or seek emergency medical care for any new, worsening or concerning symptoms. Patient verbalized understanding and is in agreement with the above plan.  Clive Blas, NP [EKG obtained to assist in diagnosis and management of assessed problem(s)] : EKG obtained to assist in diagnosis and management of assessed problem(s)

## 2024-01-12 ENCOUNTER — APPOINTMENT (OUTPATIENT)
Dept: CARDIOLOGY | Facility: CLINIC | Age: 74
End: 2024-01-12
Payer: COMMERCIAL

## 2024-01-12 PROCEDURE — 93306 TTE W/DOPPLER COMPLETE: CPT

## 2024-01-18 ENCOUNTER — NON-APPOINTMENT (OUTPATIENT)
Age: 74
End: 2024-01-18

## 2024-01-18 ENCOUNTER — TRANSCRIPTION ENCOUNTER (OUTPATIENT)
Age: 74
End: 2024-01-18

## 2024-04-05 ENCOUNTER — OFFICE (OUTPATIENT)
Dept: URBAN - METROPOLITAN AREA CLINIC 104 | Facility: CLINIC | Age: 74
Setting detail: OPHTHALMOLOGY
End: 2024-04-05
Payer: MEDICARE

## 2024-04-05 DIAGNOSIS — Z79.84: ICD-10-CM

## 2024-04-05 DIAGNOSIS — H35.3132: ICD-10-CM

## 2024-04-05 DIAGNOSIS — Z96.1: ICD-10-CM

## 2024-04-05 DIAGNOSIS — H43.813: ICD-10-CM

## 2024-04-05 DIAGNOSIS — E11.9: ICD-10-CM

## 2024-04-05 DIAGNOSIS — H26.493: ICD-10-CM

## 2024-04-05 DIAGNOSIS — H40.013: ICD-10-CM

## 2024-04-05 PROCEDURE — 92133 CPTRZD OPH DX IMG PST SGM ON: CPT | Performed by: OPTOMETRIST

## 2024-04-05 PROCEDURE — 92014 COMPRE OPH EXAM EST PT 1/>: CPT | Performed by: OPTOMETRIST

## 2024-07-01 ENCOUNTER — APPOINTMENT (OUTPATIENT)
Dept: CARDIOLOGY | Facility: CLINIC | Age: 74
End: 2024-07-01
Payer: MEDICARE

## 2024-07-01 ENCOUNTER — NON-APPOINTMENT (OUTPATIENT)
Age: 74
End: 2024-07-01

## 2024-07-01 VITALS
HEART RATE: 73 BPM | DIASTOLIC BLOOD PRESSURE: 65 MMHG | HEIGHT: 72 IN | BODY MASS INDEX: 23.3 KG/M2 | SYSTOLIC BLOOD PRESSURE: 107 MMHG | WEIGHT: 172 LBS | OXYGEN SATURATION: 97 %

## 2024-07-01 DIAGNOSIS — I10 ESSENTIAL (PRIMARY) HYPERTENSION: ICD-10-CM

## 2024-07-01 DIAGNOSIS — I77.810 THORACIC AORTIC ECTASIA: ICD-10-CM

## 2024-07-01 DIAGNOSIS — I42.9 CARDIOMYOPATHY, UNSPECIFIED: ICD-10-CM

## 2024-07-01 PROCEDURE — 99214 OFFICE O/P EST MOD 30 MIN: CPT

## 2024-07-01 PROCEDURE — 93000 ELECTROCARDIOGRAM COMPLETE: CPT

## 2024-07-01 RX ORDER — METFORMIN HYDROCHLORIDE 1000 MG/1
1000 TABLET, COATED ORAL TWICE DAILY
Refills: 0 | Status: ACTIVE | COMMUNITY

## 2024-09-11 ENCOUNTER — OFFICE (OUTPATIENT)
Dept: URBAN - METROPOLITAN AREA CLINIC 104 | Facility: CLINIC | Age: 74
Setting detail: OPHTHALMOLOGY
End: 2024-09-11
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
## 2024-09-11 PROCEDURE — NO SHOW FE NO SHOW FEE: Performed by: OPTOMETRIST

## 2024-10-02 ENCOUNTER — OFFICE (OUTPATIENT)
Dept: URBAN - METROPOLITAN AREA CLINIC 104 | Facility: CLINIC | Age: 74
Setting detail: OPHTHALMOLOGY
End: 2024-10-02
Payer: MEDICARE

## 2024-10-02 DIAGNOSIS — H40.013: ICD-10-CM

## 2024-10-02 DIAGNOSIS — H26.493: ICD-10-CM

## 2024-10-02 DIAGNOSIS — H35.3132: ICD-10-CM

## 2024-10-02 PROCEDURE — 92014 COMPRE OPH EXAM EST PT 1/>: CPT | Performed by: OPTOMETRIST

## 2024-10-02 PROCEDURE — 92134 CPTRZ OPH DX IMG PST SGM RTA: CPT | Performed by: OPTOMETRIST

## 2024-10-02 ASSESSMENT — REFRACTION_CURRENTRX
OS_ADD: +2.75
OD_AXIS: 001
OS_SPHERE: +0.75
OD_ADD: +2.75
OD_CYLINDER: -0.50
OD_SPHERE: PLANO
OD_OVR_VA: 20/
OS_AXIS: 068
OS_CYLINDER: -1.00
OS_OVR_VA: 20/

## 2024-10-02 ASSESSMENT — KERATOMETRY
OS_K1POWER_DIOPTERS: 44.00
OS_K2POWER_DIOPTERS: 45.06
OD_K1POWER_DIOPTERS: 44.18
OD_K2POWER_DIOPTERS: 44.94
OS_AXISANGLE_DEGREES: 166
OD_AXISANGLE_DEGREES: 026

## 2024-10-02 ASSESSMENT — PACHYMETRY
OD_CT_UM: 580
OS_CT_CORRECTION: -3
OS_CT_UM: 581
OD_CT_CORRECTION: -3

## 2024-10-02 ASSESSMENT — REFRACTION_AUTOREFRACTION
OD_CYLINDER: -0.75
OS_CYLINDER: -1.50
OD_AXIS: 139
OS_AXIS: 076
OD_SPHERE: +0.50
OS_SPHERE: +1.25

## 2024-10-02 ASSESSMENT — CONFRONTATIONAL VISUAL FIELD TEST (CVF)
OD_FINDINGS: FULL
OS_FINDINGS: FULL

## 2024-10-02 ASSESSMENT — VISUAL ACUITY
OS_BCVA: 20/30-1
OD_BCVA: 20/20-1

## 2024-10-02 ASSESSMENT — TONOMETRY
OD_IOP_MMHG: 16
OS_IOP_MMHG: 16

## 2025-03-15 ENCOUNTER — NON-APPOINTMENT (OUTPATIENT)
Age: 75
End: 2025-03-15

## 2025-03-17 ENCOUNTER — APPOINTMENT (OUTPATIENT)
Dept: CARDIOLOGY | Facility: CLINIC | Age: 75
End: 2025-03-17
Payer: MEDICARE

## 2025-03-17 ENCOUNTER — NON-APPOINTMENT (OUTPATIENT)
Age: 75
End: 2025-03-17

## 2025-03-17 ENCOUNTER — TRANSCRIPTION ENCOUNTER (OUTPATIENT)
Age: 75
End: 2025-03-17

## 2025-03-17 VITALS
HEART RATE: 76 BPM | OXYGEN SATURATION: 95 % | SYSTOLIC BLOOD PRESSURE: 112 MMHG | BODY MASS INDEX: 22.48 KG/M2 | DIASTOLIC BLOOD PRESSURE: 68 MMHG | HEIGHT: 72 IN | WEIGHT: 166 LBS

## 2025-03-17 DIAGNOSIS — I50.22 CHRONIC SYSTOLIC (CONGESTIVE) HEART FAILURE: ICD-10-CM

## 2025-03-17 DIAGNOSIS — I42.8 OTHER CARDIOMYOPATHIES: ICD-10-CM

## 2025-03-17 PROCEDURE — 99204 OFFICE O/P NEW MOD 45 MIN: CPT

## 2025-03-17 PROCEDURE — 93000 ELECTROCARDIOGRAM COMPLETE: CPT

## 2025-03-17 RX ORDER — LOSARTAN POTASSIUM 100 MG/1
100 TABLET, FILM COATED ORAL DAILY
Qty: 90 | Refills: 0 | Status: ACTIVE | COMMUNITY
Start: 2025-03-17 | End: 1900-01-01

## 2025-03-21 ENCOUNTER — APPOINTMENT (OUTPATIENT)
Dept: CARDIOTHORACIC SURGERY | Facility: CLINIC | Age: 75
End: 2025-03-21

## 2025-03-28 ENCOUNTER — APPOINTMENT (OUTPATIENT)
Dept: CARDIOTHORACIC SURGERY | Facility: CLINIC | Age: 75
End: 2025-03-28
Payer: MEDICARE

## 2025-03-28 VITALS
HEART RATE: 79 BPM | HEIGHT: 66 IN | BODY MASS INDEX: 26.68 KG/M2 | SYSTOLIC BLOOD PRESSURE: 153 MMHG | WEIGHT: 166 LBS | RESPIRATION RATE: 16 BRPM | DIASTOLIC BLOOD PRESSURE: 91 MMHG | OXYGEN SATURATION: 96 %

## 2025-03-28 DIAGNOSIS — I77.810 THORACIC AORTIC ECTASIA: ICD-10-CM

## 2025-03-28 PROCEDURE — 99204 OFFICE O/P NEW MOD 45 MIN: CPT

## 2025-03-28 RX ORDER — SITAGLIPTIN 25 MG/1
25 TABLET, FILM COATED ORAL DAILY
Refills: 0 | Status: ACTIVE | COMMUNITY

## 2025-03-28 RX ORDER — LINAGLIPTIN 5 MG/1
5 TABLET, FILM COATED ORAL DAILY
Refills: 0 | Status: ACTIVE | COMMUNITY

## 2025-03-28 RX ORDER — LEVOTHYROXINE SODIUM 0.17 MG/1
TABLET ORAL DAILY
Refills: 0 | Status: ACTIVE | COMMUNITY

## 2025-03-31 ENCOUNTER — APPOINTMENT (OUTPATIENT)
Dept: CT IMAGING | Facility: CLINIC | Age: 75
End: 2025-03-31
Payer: MEDICARE

## 2025-03-31 PROCEDURE — 71275 CT ANGIOGRAPHY CHEST: CPT

## 2025-04-03 ENCOUNTER — NON-APPOINTMENT (OUTPATIENT)
Age: 75
End: 2025-04-03

## 2025-04-08 ENCOUNTER — RESULT REVIEW (OUTPATIENT)
Age: 75
End: 2025-04-08

## 2025-04-08 ENCOUNTER — OUTPATIENT (OUTPATIENT)
Dept: OUTPATIENT SERVICES | Facility: HOSPITAL | Age: 75
LOS: 1 days | End: 2025-04-08
Payer: MEDICARE

## 2025-04-08 DIAGNOSIS — I77.810 THORACIC AORTIC ECTASIA: ICD-10-CM

## 2025-04-08 PROCEDURE — 93306 TTE W/DOPPLER COMPLETE: CPT

## 2025-04-08 PROCEDURE — 93306 TTE W/DOPPLER COMPLETE: CPT | Mod: 26

## 2025-04-09 ENCOUNTER — OFFICE (OUTPATIENT)
Dept: URBAN - METROPOLITAN AREA CLINIC 104 | Facility: CLINIC | Age: 75
Setting detail: OPHTHALMOLOGY
End: 2025-04-09
Payer: MEDICARE

## 2025-04-09 DIAGNOSIS — H26.493: ICD-10-CM

## 2025-04-09 DIAGNOSIS — H40.013: ICD-10-CM

## 2025-04-09 DIAGNOSIS — H35.3132: ICD-10-CM

## 2025-04-09 PROCEDURE — 92014 COMPRE OPH EXAM EST PT 1/>: CPT | Performed by: OPTOMETRIST

## 2025-04-09 PROCEDURE — 92133 CPTRZD OPH DX IMG PST SGM ON: CPT | Performed by: OPTOMETRIST

## 2025-04-09 ASSESSMENT — REFRACTION_AUTOREFRACTION
OD_AXIS: 150
OS_AXIS: 073
OD_CYLINDER: -0.75
OD_SPHERE: +0.50
OS_CYLINDER: -1.75
OS_SPHERE: +1.25

## 2025-04-09 ASSESSMENT — VISUAL ACUITY
OD_BCVA: 20/20
OS_BCVA: 20/20-1

## 2025-04-09 ASSESSMENT — CONFRONTATIONAL VISUAL FIELD TEST (CVF)
OD_FINDINGS: FULL
OS_FINDINGS: FULL

## 2025-04-09 ASSESSMENT — REFRACTION_CURRENTRX
OS_AXIS: 064
OS_CYLINDER: -1.00
OD_ADD: +2.75
OS_SPHERE: +0.75
OD_OVR_VA: 20/
OS_ADD: +2.75
OD_SPHERE: -0.25
OD_CYLINDER: -0.25
OD_AXIS: 007
OS_OVR_VA: 20/

## 2025-04-09 ASSESSMENT — KERATOMETRY
OS_AXISANGLE_DEGREES: 169
OD_AXISANGLE_DEGREES: 049
OD_K2POWER_DIOPTERS: 45.06
OD_K1POWER_DIOPTERS: 44.12
OS_K2POWER_DIOPTERS: 45.12
OS_K1POWER_DIOPTERS: 43.66

## 2025-04-25 ENCOUNTER — APPOINTMENT (OUTPATIENT)
Dept: CARDIOTHORACIC SURGERY | Facility: CLINIC | Age: 75
End: 2025-04-25
Payer: MEDICARE

## 2025-04-25 DIAGNOSIS — I10 ESSENTIAL (PRIMARY) HYPERTENSION: ICD-10-CM

## 2025-04-25 DIAGNOSIS — I77.810 THORACIC AORTIC ECTASIA: ICD-10-CM

## 2025-04-25 PROCEDURE — 99213 OFFICE O/P EST LOW 20 MIN: CPT | Mod: 93

## 2025-04-25 PROCEDURE — G2211 COMPLEX E/M VISIT ADD ON: CPT | Mod: 93

## 2025-05-02 ENCOUNTER — OFFICE (OUTPATIENT)
Dept: URBAN - METROPOLITAN AREA CLINIC 104 | Facility: CLINIC | Age: 75
Setting detail: OPHTHALMOLOGY
End: 2025-05-02
Payer: MEDICARE

## 2025-05-02 DIAGNOSIS — H43.813: ICD-10-CM

## 2025-05-02 DIAGNOSIS — E11.9: ICD-10-CM

## 2025-05-02 DIAGNOSIS — H35.3132: ICD-10-CM

## 2025-05-02 PROCEDURE — 92012 INTRM OPH EXAM EST PATIENT: CPT | Performed by: OPHTHALMOLOGY

## 2025-05-02 PROCEDURE — 92134 CPTRZ OPH DX IMG PST SGM RTA: CPT | Performed by: OPHTHALMOLOGY

## 2025-05-02 ASSESSMENT — CONFRONTATIONAL VISUAL FIELD TEST (CVF)
OD_FINDINGS: FULL
OS_FINDINGS: FULL

## 2025-05-02 ASSESSMENT — KERATOMETRY
OS_AXISANGLE_DEGREES: 169
OD_K2POWER_DIOPTERS: 45.06
OS_K1POWER_DIOPTERS: 43.66
OD_K1POWER_DIOPTERS: 44.12
OD_AXISANGLE_DEGREES: 049
OS_K2POWER_DIOPTERS: 45.12

## 2025-05-02 ASSESSMENT — VISUAL ACUITY
OS_BCVA: 20/20
OD_BCVA: 20/20

## 2025-05-02 ASSESSMENT — REFRACTION_AUTOREFRACTION
OS_CYLINDER: -1.75
OD_AXIS: 150
OS_SPHERE: +1.25
OD_CYLINDER: -0.75
OS_AXIS: 073
OD_SPHERE: +0.50

## 2025-07-08 ENCOUNTER — OFFICE (OUTPATIENT)
Dept: URBAN - METROPOLITAN AREA CLINIC 104 | Facility: CLINIC | Age: 75
Setting detail: OPHTHALMOLOGY
End: 2025-07-08
Payer: MEDICARE

## 2025-07-08 DIAGNOSIS — H40.013: ICD-10-CM

## 2025-07-08 DIAGNOSIS — H35.3132: ICD-10-CM

## 2025-07-08 PROCEDURE — 92083 EXTENDED VISUAL FIELD XM: CPT | Performed by: OPTOMETRIST

## 2025-07-08 PROCEDURE — 92250 FUNDUS PHOTOGRAPHY W/I&R: CPT | Performed by: OPTOMETRIST

## 2025-07-08 PROCEDURE — 99213 OFFICE O/P EST LOW 20 MIN: CPT | Performed by: OPTOMETRIST

## 2025-07-08 ASSESSMENT — CONFRONTATIONAL VISUAL FIELD TEST (CVF)
OS_FINDINGS: FULL
OD_FINDINGS: FULL

## 2025-07-08 ASSESSMENT — REFRACTION_AUTOREFRACTION
OD_CYLINDER: -1.00
OS_SPHERE: +1.00
OS_CYLINDER: -0.50
OD_AXIS: 115
OS_AXIS: 75
OD_SPHERE: +0.75

## 2025-07-08 ASSESSMENT — KERATOMETRY
OS_AXISANGLE_DEGREES: 150
OD_K2POWER_DIOPTERS: 44.94
OS_K2POWER_DIOPTERS: 45.00
OD_AXISANGLE_DEGREES: 17
OS_K1POWER_DIOPTERS: 43.89
OD_K1POWER_DIOPTERS: 43.83

## 2025-07-08 ASSESSMENT — VISUAL ACUITY
OD_BCVA: 20/20
OS_BCVA: 20/20-1

## 2025-08-05 ENCOUNTER — OFFICE (OUTPATIENT)
Dept: URBAN - METROPOLITAN AREA CLINIC 104 | Facility: CLINIC | Age: 75
Setting detail: OPHTHALMOLOGY
End: 2025-08-05
Payer: MEDICARE

## 2025-08-05 DIAGNOSIS — H40.013: ICD-10-CM

## 2025-08-05 PROCEDURE — 92014 COMPRE OPH EXAM EST PT 1/>: CPT | Performed by: OPTOMETRIST

## 2025-08-05 PROCEDURE — 92083 EXTENDED VISUAL FIELD XM: CPT | Performed by: OPTOMETRIST

## 2025-08-05 ASSESSMENT — REFRACTION_CURRENTRX
OD_OVR_VA: 20/
OD_AXIS: 179
OS_CYLINDER: -1.00
OS_SPHERE: +0.75
OS_OVR_VA: 20/
OD_CYLINDER: -0.25
OD_ADD: +2.75
OS_ADD: +2.75
OS_AXIS: 70
OD_SPHERE: -0.25

## 2025-08-05 ASSESSMENT — REFRACTION_AUTOREFRACTION
OS_SPHERE: +1.25
OD_AXIS: 111
OD_SPHERE: +0.75
OS_CYLINDER: -1.75
OD_CYLINDER: -0.75
OS_AXIS: 71

## 2025-08-05 ASSESSMENT — KERATOMETRY
OD_AXISANGLE_DEGREES: 31
OS_K2POWER_DIOPTERS: 45.24
OD_K1POWER_DIOPTERS: 44.18
METHOD_AUTO_MANUAL: AUTO
OS_K1POWER_DIOPTERS: 43.95
OD_K2POWER_DIOPTERS: 45.00
OS_AXISANGLE_DEGREES: 164

## 2025-08-05 ASSESSMENT — VISUAL ACUITY
OD_BCVA: 20/20
OS_BCVA: 20/20-1

## 2025-08-05 ASSESSMENT — CONFRONTATIONAL VISUAL FIELD TEST (CVF)
OD_FINDINGS: FULL
OS_FINDINGS: FULL